# Patient Record
Sex: MALE | Race: WHITE | NOT HISPANIC OR LATINO | Employment: OTHER | URBAN - METROPOLITAN AREA
[De-identification: names, ages, dates, MRNs, and addresses within clinical notes are randomized per-mention and may not be internally consistent; named-entity substitution may affect disease eponyms.]

---

## 2017-04-24 ENCOUNTER — LAB VISIT (OUTPATIENT)
Dept: LAB | Facility: HOSPITAL | Age: 62
End: 2017-04-24
Attending: FAMILY MEDICINE
Payer: MEDICARE

## 2017-04-24 DIAGNOSIS — E11.69 DIABETES MELLITUS ASSOCIATED WITH HORMONAL ETIOLOGY: ICD-10-CM

## 2017-04-24 DIAGNOSIS — E03.9 UNSPECIFIED HYPOTHYROIDISM: ICD-10-CM

## 2017-04-24 DIAGNOSIS — I10 ESSENTIAL HYPERTENSION, MALIGNANT: Primary | ICD-10-CM

## 2017-04-24 LAB
BASOPHILS # BLD AUTO: 0.06 K/UL
BASOPHILS NFR BLD: 0.7 %
BILIRUB UR QL STRIP: NEGATIVE
CLARITY UR REFRACT.AUTO: CLEAR
COLOR UR AUTO: YELLOW
COMPLEXED PSA SERPL-MCNC: 3.9 NG/ML
DIFFERENTIAL METHOD: ABNORMAL
EOSINOPHIL # BLD AUTO: 0.2 K/UL
EOSINOPHIL NFR BLD: 2.2 %
ERYTHROCYTE [DISTWIDTH] IN BLOOD BY AUTOMATED COUNT: 13.5 %
GLUCOSE UR QL STRIP: NEGATIVE
HCT VFR BLD AUTO: 36.8 %
HGB BLD-MCNC: 12.3 G/DL
HGB UR QL STRIP: NEGATIVE
KETONES UR QL STRIP: NEGATIVE
LEUKOCYTE ESTERASE UR QL STRIP: NEGATIVE
LYMPHOCYTES # BLD AUTO: 1.6 K/UL
LYMPHOCYTES NFR BLD: 19.4 %
MCH RBC QN AUTO: 30.4 PG
MCHC RBC AUTO-ENTMCNC: 33.4 %
MCV RBC AUTO: 91 FL
MONOCYTES # BLD AUTO: 0.5 K/UL
MONOCYTES NFR BLD: 6.2 %
NEUTROPHILS # BLD AUTO: 6 K/UL
NEUTROPHILS NFR BLD: 71.3 %
NITRITE UR QL STRIP: NEGATIVE
PH UR STRIP: 5 [PH] (ref 5–8)
PLATELET # BLD AUTO: 241 K/UL
PMV BLD AUTO: 9.6 FL
PROT UR QL STRIP: NEGATIVE
RBC # BLD AUTO: 4.05 M/UL
SP GR UR STRIP: 1.02 (ref 1–1.03)
URN SPEC COLLECT METH UR: NORMAL
UROBILINOGEN UR STRIP-ACNC: NEGATIVE EU/DL
WBC # BLD AUTO: 8.36 K/UL

## 2017-04-24 PROCEDURE — 82570 ASSAY OF URINE CREATININE: CPT

## 2017-04-24 PROCEDURE — 84439 ASSAY OF FREE THYROXINE: CPT

## 2017-04-24 PROCEDURE — 84153 ASSAY OF PSA TOTAL: CPT

## 2017-04-24 PROCEDURE — 84443 ASSAY THYROID STIM HORMONE: CPT

## 2017-04-24 PROCEDURE — 36415 COLL VENOUS BLD VENIPUNCTURE: CPT | Mod: PO

## 2017-04-24 PROCEDURE — 81003 URINALYSIS AUTO W/O SCOPE: CPT

## 2017-04-24 PROCEDURE — 85025 COMPLETE CBC W/AUTO DIFF WBC: CPT

## 2017-04-24 PROCEDURE — 80061 LIPID PANEL: CPT

## 2017-04-24 PROCEDURE — 80053 COMPREHEN METABOLIC PANEL: CPT

## 2017-04-25 LAB
ALBUMIN SERPL BCP-MCNC: 3.9 G/DL
ALP SERPL-CCNC: 67 U/L
ALT SERPL W/O P-5'-P-CCNC: 13 U/L
ANION GAP SERPL CALC-SCNC: 13 MMOL/L
AST SERPL-CCNC: 20 U/L
BILIRUB SERPL-MCNC: 0.6 MG/DL
BUN SERPL-MCNC: 27 MG/DL
CALCIUM SERPL-MCNC: 9.6 MG/DL
CHLORIDE SERPL-SCNC: 107 MMOL/L
CHOLEST/HDLC SERPL: 2.8 {RATIO}
CO2 SERPL-SCNC: 20 MMOL/L
CREAT SERPL-MCNC: 1.5 MG/DL
CREAT UR-MCNC: 182 MG/DL
EST. GFR  (AFRICAN AMERICAN): 56.5 ML/MIN/1.73 M^2
EST. GFR  (NON AFRICAN AMERICAN): 48.8 ML/MIN/1.73 M^2
GLUCOSE SERPL-MCNC: 92 MG/DL
HDL/CHOLESTEROL RATIO: 35.7 %
HDLC SERPL-MCNC: 129 MG/DL
HDLC SERPL-MCNC: 46 MG/DL
LDLC SERPL CALC-MCNC: 72.4 MG/DL
MICROALBUMIN UR DL<=1MG/L-MCNC: 19 UG/ML
MICROALBUMIN/CREATININE RATIO: 10.4 UG/MG
NONHDLC SERPL-MCNC: 83 MG/DL
POTASSIUM SERPL-SCNC: 5 MMOL/L
PROT SERPL-MCNC: 6.7 G/DL
SODIUM SERPL-SCNC: 140 MMOL/L
T4 FREE SERPL-MCNC: 0.98 NG/DL
TRIGL SERPL-MCNC: 53 MG/DL
TSH SERPL DL<=0.005 MIU/L-ACNC: 4.66 UIU/ML

## 2024-11-30 ENCOUNTER — OFFICE VISIT (OUTPATIENT)
Dept: URGENT CARE | Facility: CLINIC | Age: 69
End: 2024-11-30
Payer: MEDICARE

## 2024-11-30 ENCOUNTER — HOSPITAL ENCOUNTER (OUTPATIENT)
Dept: RADIOLOGY | Facility: CLINIC | Age: 69
Discharge: HOME | End: 2024-11-30
Attending: PHYSICIAN ASSISTANT
Payer: MEDICARE

## 2024-11-30 VITALS
DIASTOLIC BLOOD PRESSURE: 72 MMHG | BODY MASS INDEX: 28.5 KG/M2 | OXYGEN SATURATION: 98 % | HEIGHT: 68 IN | TEMPERATURE: 97 F | RESPIRATION RATE: 16 BRPM | SYSTOLIC BLOOD PRESSURE: 124 MMHG | WEIGHT: 188.06 LBS | HEART RATE: 75 BPM

## 2024-11-30 DIAGNOSIS — J20.8 ACUTE BACTERIAL BRONCHITIS: ICD-10-CM

## 2024-11-30 DIAGNOSIS — B96.89 ACUTE BACTERIAL BRONCHITIS: ICD-10-CM

## 2024-11-30 DIAGNOSIS — J20.8 ACUTE BACTERIAL BRONCHITIS: Primary | ICD-10-CM

## 2024-11-30 DIAGNOSIS — J20.9 ACUTE BRONCHITIS, UNSPECIFIED ORGANISM: ICD-10-CM

## 2024-11-30 DIAGNOSIS — B96.89 ACUTE BACTERIAL BRONCHITIS: Primary | ICD-10-CM

## 2024-11-30 PROCEDURE — 71046 X-RAY EXAM CHEST 2 VIEWS: CPT | Mod: S$GLB,,, | Performed by: RADIOLOGY

## 2024-11-30 PROCEDURE — 94640 AIRWAY INHALATION TREATMENT: CPT | Mod: S$GLB,,, | Performed by: PHYSICIAN ASSISTANT

## 2024-11-30 PROCEDURE — 99204 OFFICE O/P NEW MOD 45 MIN: CPT | Mod: 25,S$GLB,, | Performed by: PHYSICIAN ASSISTANT

## 2024-11-30 RX ORDER — METFORMIN HYDROCHLORIDE 500 MG/1
500 TABLET ORAL
COMMUNITY
Start: 2024-09-25 | End: 2025-03-24

## 2024-11-30 RX ORDER — ALBUTEROL SULFATE 0.83 MG/ML
5 SOLUTION RESPIRATORY (INHALATION)
Status: COMPLETED | OUTPATIENT
Start: 2024-11-30 | End: 2024-11-30

## 2024-11-30 RX ORDER — IPRATROPIUM BROMIDE 0.5 MG/2.5ML
0.5 SOLUTION RESPIRATORY (INHALATION)
Status: COMPLETED | OUTPATIENT
Start: 2024-11-30 | End: 2024-11-30

## 2024-11-30 RX ORDER — CLOPIDOGREL BISULFATE 75 MG/1
1 TABLET ORAL EVERY MORNING
COMMUNITY
Start: 2024-09-25

## 2024-11-30 RX ORDER — SPIRONOLACTONE 25 MG/1
1 TABLET ORAL DAILY
COMMUNITY
Start: 2024-09-25

## 2024-11-30 RX ORDER — LOSARTAN POTASSIUM 50 MG/1
1 TABLET ORAL EVERY MORNING
COMMUNITY
Start: 2024-09-25

## 2024-11-30 RX ORDER — AMIODARONE HYDROCHLORIDE 200 MG/1
1 TABLET ORAL DAILY
COMMUNITY
Start: 2024-09-25

## 2024-11-30 RX ORDER — PROMETHAZINE HYDROCHLORIDE AND DEXTROMETHORPHAN HYDROBROMIDE 6.25; 15 MG/5ML; MG/5ML
5 SYRUP ORAL EVERY 6 HOURS PRN
Qty: 140 ML | Refills: 0 | Status: SHIPPED | OUTPATIENT
Start: 2024-11-30 | End: 2024-12-07

## 2024-11-30 RX ORDER — LORATADINE 10 MG/1
1 TABLET ORAL EVERY MORNING
COMMUNITY

## 2024-11-30 RX ORDER — CARVEDILOL 12.5 MG/1
12.5 TABLET ORAL
COMMUNITY
Start: 2024-09-25

## 2024-11-30 RX ORDER — ASPIRIN 81 MG/1
1 TABLET ORAL EVERY MORNING
COMMUNITY

## 2024-11-30 RX ORDER — LEVOTHYROXINE SODIUM 75 UG/1
75 TABLET ORAL
COMMUNITY
Start: 2024-09-25 | End: 2025-03-24

## 2024-11-30 RX ORDER — CODEINE PHOSPHATE AND GUAIFENESIN 10; 100 MG/5ML; MG/5ML
5 SOLUTION ORAL EVERY 6 HOURS PRN
Qty: 240 ML | Refills: 0 | Status: SHIPPED | OUTPATIENT
Start: 2024-11-30 | End: 2024-11-30

## 2024-11-30 RX ORDER — FUROSEMIDE 40 MG/1
40 TABLET ORAL
COMMUNITY
Start: 2024-09-25 | End: 2025-03-24

## 2024-11-30 RX ORDER — PREDNISONE 20 MG/1
TABLET ORAL
Qty: 18 TABLET | Refills: 0 | Status: SHIPPED | OUTPATIENT
Start: 2024-11-30 | End: 2024-12-09

## 2024-11-30 RX ORDER — ATORVASTATIN CALCIUM 40 MG/1
1 TABLET, FILM COATED ORAL EVERY MORNING
COMMUNITY
Start: 2024-09-25

## 2024-11-30 RX ORDER — ALBUTEROL SULFATE 90 UG/1
2 INHALANT RESPIRATORY (INHALATION) EVERY 6 HOURS PRN
Qty: 8 G | Refills: 0 | Status: SHIPPED | OUTPATIENT
Start: 2024-11-30

## 2024-11-30 RX ORDER — CEFDINIR 300 MG/1
300 CAPSULE ORAL 2 TIMES DAILY
Qty: 14 CAPSULE | Refills: 0 | Status: SHIPPED | OUTPATIENT
Start: 2024-11-30 | End: 2024-12-07

## 2024-11-30 RX ADMIN — ALBUTEROL SULFATE 5 MG: 0.83 SOLUTION RESPIRATORY (INHALATION) at 12:11

## 2024-11-30 RX ADMIN — IPRATROPIUM BROMIDE 0.5 MG: 0.5 SOLUTION RESPIRATORY (INHALATION) at 12:11

## 2024-11-30 NOTE — PROGRESS NOTES
"Subjective:      Patient ID: Bogdan Akbar is a 69 y.o. male.    Vitals:  height is 5' 8" (1.727 m) and weight is 85.3 kg (188 lb 0.8 oz). His tympanic temperature is 97.2 °F (36.2 °C). His blood pressure is 124/72 and his pulse is 75. His respiration is 16 and oxygen saturation is 98%.     Chief Complaint: Cough    Bogdan Akbar is a 69 year old male with PMHx of diastolic congestive heart failure, Type II Diabetes Mellitus, HTN, and HLP presenting with cough x 10 days. He states the cough is productive with blood tinged sputum. Nothing makes it better or worse. He has associated wheezing, sinus congestion, chest congestion, headaches, and sore throat. He denies calf pain, leg swelling, pink-frothy sputum, chest pain, SOB,  heart palpitations, fever, and chills. He denies any recent travel. He has tried OTC cold/flu medicine with no relief. He smoked cigarettes for 20 years, but quit 1 year ago. He has no history of pulmonary embolism/DVT.     Cough  Associated symptoms include hemoptysis, postnasal drip, a sore throat and wheezing. Pertinent negatives include no chest pain, chills, fever or shortness of breath.       Constitution: Negative for chills, fever and international travel in last 60 days.   HENT:  Positive for congestion, postnasal drip, sinus pressure and sore throat. Negative for trouble swallowing and voice change.    Cardiovascular:  Negative for chest pain, leg swelling and palpitations.   Respiratory:  Positive for chest tightness, cough, sputum production, bloody sputum and wheezing. Negative for shortness of breath and asthma.    Gastrointestinal:  Negative for abdominal pain, nausea, vomiting and diarrhea.   Allergic/Immunologic: Negative for asthma.      Objective:     Physical Exam   Constitutional: He is oriented to person, place, and time.  Non-toxic appearance. He does not appear ill. No distress. normal  HENT:   Head: Normocephalic and atraumatic.   Ears:   Right Ear: Tympanic membrane, " external ear and ear canal normal.   Left Ear: Tympanic membrane, external ear and ear canal normal.   Nose: Rhinorrhea and congestion present.   Mouth/Throat: Mucous membranes are moist. No oropharyngeal exudate or posterior oropharyngeal erythema. Oropharynx is clear.   Eyes: Conjunctivae are normal.   Cardiovascular: Normal rate, regular rhythm, normal heart sounds and normal pulses.      Comments: No calf TTP. Symmetric calf size. Negative homans sign    Pulmonary/Chest: Effort normal. No stridor. No respiratory distress. He has wheezes in the right upper field, the right middle field, the right lower field, the left upper field, the left middle field and the left lower field. He has no rhonchi. He has no rales.         Comments: Moderate insp/exp Wheezing in bilateral lungs, most prominently heard in Left lung.    Abdominal: Normal appearance.   Musculoskeletal:         General: No swelling or tenderness.      Right lower le+ Pitting Edema present.      Left lower le+ Pitting Edema present.   Lymphadenopathy:     He has no cervical adenopathy.   Neurological: He is alert and oriented to person, place, and time.   Skin: Skin is warm and not diaphoretic. Capillary refill takes less than 2 seconds.   Vitals reviewed.      Assessment:     1. Acute bacterial bronchitis        Plan:       Acute bacterial bronchitis  -     X-Ray Chest PA And Lateral; Future; Expected date: 2024  -     albuterol nebulizer solution 5 mg  -     ipratropium 0.02 % nebulizer solution 0.5 mg  -     guaiFENesin-codeine 100-10 mg/5 ml (TUSSI-ORGANIDIN NR)  mg/5 mL syrup; Take 5 mLs by mouth every 6 (six) hours as needed for Cough.  Dispense: 240 mL; Refill: 0  -     predniSONE (DELTASONE) 20 MG tablet; Take 1 tablet (20 mg total) by mouth 3 (three) times daily for 3 days, THEN 1 tablet (20 mg total) 2 (two) times daily for 3 days, THEN 1 tablet (20 mg total) once daily for 3 days.  Dispense: 18 tablet; Refill: 0  -      albuterol (VENTOLIN HFA) 90 mcg/actuation inhaler; Inhale 2 puffs into the lungs every 6 (six) hours as needed for Wheezing or Shortness of Breath. Rescue  Dispense: 8 g; Refill: 0  -     cefdinir (OMNICEF) 300 MG capsule; Take 1 capsule (300 mg total) by mouth 2 (two) times daily. for 7 days  Dispense: 14 capsule; Refill: 0    Caroline Fusilier PA-C [facility]       Patient Instructions   Take prescription cough syrup 4-6 hours (contains MUCINEX to break up congestion also). Rest and drink plenty of fluids.Tylenol for fever, sore throat or body aches. Albuterol 2-4 puffs every 2-4 hours for 4 hours, then 2-4 puffs every 4-6 hours for the next 18 hours, then 2-4puffs as needed for wheezing.Take cefdinir antibiotic.    May try prednisone to help with wheezing. Closely monitor BP and glucose while taking as it can elevate. Discontinue if blood pressure consistently > 160/90, sugar > 250 or severe side effects - may cause anxiety, insomnia, palpitations and fluid retention.      You need re-evaluated for any new persistent fever > 4 days, progressive chest pain or shortness of breath despite treatment, or cough not improving > 4 weeks.          Medical Decision Making:   Urgent Care Management:  69 year old male with pmhx of congestive heart failure, T2DM, HTN, and HLP presents with productive cough with blood-tinged sputum x 10 days. He reports associated wheezing, sinus congestion, and sore throat. He denied SOB, chest pain, leg swelling, calf pain, recent travel, and history of Asthma/COPD. He stated he smoked cigarettes for 20 year and recently quit a year ago. On exam, no tachycardia, afebrile, and normal blood pressure. On exam, wheezing as appreciated in bilateral lungs.  Wells score is 1 , which places him in low risk group for having a Pulmonary embolism. Chest x-ray showed no effusion, consolidation, or pneumothorax. No other lung lesions. Symptoms are most likely due to COPD exacerbation vs  Acute bacterial bronchitis. Duoneb treatment given in clinic for wheezing. Reassessment- wheezing resolved. No distress. Prednisone taper given take as instructed - does have noted CHF remotely but only on diuretics prn. Albuterol for wheezing , take as needed at home. Guaifenesin-codeine given for cough. Return or go to ER if symptoms worsen or do not improve.     Pt reevaluated after duoneb in clinic.

## 2024-11-30 NOTE — PATIENT INSTRUCTIONS
Take prescription cough syrup 4-6 hours (contains MUCINEX to break up congestion also). Rest and drink plenty of fluids.Tylenol for fever, sore throat or body aches. Albuterol 2-4 puffs every 2-4 hours for 4 hours, then 2-4 puffs every 4-6 hours for the next 18 hours, then 2-4puffs as needed for wheezing.Take cefdinir antibiotic.    May try prednisone to help with wheezing. Closely monitor BP and glucose while taking as it can elevate. Discontinue if blood pressure consistently > 160/90, sugar > 250 or severe side effects - may cause anxiety, insomnia, palpitations and fluid retention.      You need re-evaluated for any new persistent fever > 4 days, progressive chest pain or shortness of breath despite treatment, or cough not improving > 4 weeks.

## 2024-11-30 NOTE — PROGRESS NOTES
Subjective:      Patient ID: Bogdan Akbar is a 69 y.o. male.    Vitals:  vitals were not taken for this visit.     Chief Complaint: Cough    Patient presents today with cough and congestion. Symptoms started Thursday the 21st, so approximately 10 days ago. Cough is productive. Patient started with mucus last Saturday. Mucus is blood-tinged. OTC - nader-seltzer the first 3 days. Patient does have congestive heart failure and is a type 2 diabetic. Patient has a pacemaker.    Cough  This is a new problem. The current episode started 1 to 4 weeks ago (10 days ago). The problem has been gradually worsening. The problem occurs every few minutes. The cough is Productive of sputum and productive of blood-tinged sputum. Associated symptoms include headaches. Associated symptoms comments: Chest congestion. Nothing aggravates the symptoms. Treatments tried: nader seltzer. The treatment provided no relief.       Respiratory:  Positive for cough.    Neurological:  Positive for headaches.      Objective:     Physical Exam    Assessment:     No diagnosis found.    Plan:       There are no diagnoses linked to this encounter.